# Patient Record
Sex: MALE | Employment: STUDENT | URBAN - NONMETROPOLITAN AREA
[De-identification: names, ages, dates, MRNs, and addresses within clinical notes are randomized per-mention and may not be internally consistent; named-entity substitution may affect disease eponyms.]

---
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
## 2023-11-15 ENCOUNTER — NURSE TRIAGE (OUTPATIENT)
Dept: OTHER | Facility: CLINIC | Age: 18
End: 2023-11-15

## 2023-11-15 ENCOUNTER — HOSPITAL ENCOUNTER (EMERGENCY)
Facility: HOSPITAL | Age: 18
Discharge: HOME | End: 2023-11-15
Payer: OTHER GOVERNMENT

## 2023-11-15 VITALS
DIASTOLIC BLOOD PRESSURE: 61 MMHG | WEIGHT: 190 LBS | HEIGHT: 70 IN | HEART RATE: 55 BPM | OXYGEN SATURATION: 100 % | SYSTOLIC BLOOD PRESSURE: 124 MMHG | BODY MASS INDEX: 27.2 KG/M2 | RESPIRATION RATE: 16 BRPM | TEMPERATURE: 98.1 F

## 2023-11-15 DIAGNOSIS — R51.9 ACUTE NONINTRACTABLE HEADACHE, UNSPECIFIED HEADACHE TYPE: Primary | ICD-10-CM

## 2023-11-15 LAB
GLUCOSE BLDC GLUCOMTR-MCNC: 93 MG/DL (ref 70–130)
GLUCOSE BLDC GLUCOMTR-MCNC: 93 MG/DL (ref 70–130)

## 2023-11-15 PROCEDURE — 25010000002 KETOROLAC TROMETHAMINE PER 15 MG: Performed by: NURSE PRACTITIONER

## 2023-11-15 PROCEDURE — 99283 EMERGENCY DEPT VISIT LOW MDM: CPT

## 2023-11-15 PROCEDURE — 96372 THER/PROPH/DIAG INJ SC/IM: CPT

## 2023-11-15 PROCEDURE — 63710000001 DIPHENHYDRAMINE PER 50 MG: Performed by: NURSE PRACTITIONER

## 2023-11-15 PROCEDURE — 82948 REAGENT STRIP/BLOOD GLUCOSE: CPT

## 2023-11-15 RX ORDER — METOCLOPRAMIDE 10 MG/1
10 TABLET ORAL ONCE
Status: COMPLETED | OUTPATIENT
Start: 2023-11-15 | End: 2023-11-15

## 2023-11-15 RX ORDER — KETOROLAC TROMETHAMINE 30 MG/ML
30 INJECTION, SOLUTION INTRAMUSCULAR; INTRAVENOUS ONCE
Status: COMPLETED | OUTPATIENT
Start: 2023-11-15 | End: 2023-11-15

## 2023-11-15 RX ORDER — DIPHENHYDRAMINE HCL 25 MG
25 CAPSULE ORAL ONCE
Status: COMPLETED | OUTPATIENT
Start: 2023-11-15 | End: 2023-11-15

## 2023-11-15 RX ADMIN — DIPHENHYDRAMINE HYDROCHLORIDE 25 MG: 25 CAPSULE ORAL at 17:40

## 2023-11-15 RX ADMIN — METOCLOPRAMIDE 10 MG: 10 TABLET ORAL at 17:40

## 2023-11-15 RX ADMIN — KETOROLAC TROMETHAMINE 30 MG: 30 INJECTION, SOLUTION INTRAMUSCULAR; INTRAVENOUS at 17:40

## 2023-11-15 NOTE — TELEPHONE ENCOUNTER
Location of patient: [address] call from [place] at [facility]; Patient with Red Flag Complaint requesting to establish care with [facility]. Mother,Tc, is calling. Patient is there. Subjective: Caller states \"vision is blurry, sinus symptoms\"     Current Symptoms: See above, sides of his head hurt, pressure behind eyes. Blurry vision that started today when he tries to focus on something. Onset: several hours ago; unchanged    Associated Symptoms: NA    Pain Severity: 2/10; pressure; constant    Temperature: denies fever     What has been tried: Tylenol    LMP: NA Pregnant: NA    Recommended disposition: Go to ED Now    Care advice provided, patient verbalizes understanding; denies any other questions or concerns; instructed to call back for any new or worsening symptoms. Patient/caller agrees to proceed to nearby Emergency Department. Mother says she will call back at a later time to set him up a new patient appointment. Attention Provider: Thank you for allowing me to participate in the care of your patient. The patient was connected to triage in response to information provided to the [facility]. Please do not respond through this encounter as the response is not directed to a shared pool.     Reason for Disposition   Double vision    Protocols used: Vision Loss or Change-ADULT-

## 2023-11-15 NOTE — ED PROVIDER NOTES
Subjective   History of Present Illness  Noel Herrera is a 18 year old male with no significant past medical history presents to ED today with headache and blurred vision.  Patient states that he was sitting in his truck and got out to talk to someone when he had some blurred vision.  Patient states it felt like there was a fog in his vision and he could not see very clearly.  Patient also notes he has a headache but is just pain behind his eyes.  Patient states he has had no recent falls or trauma, weakness, paresthesias, difficulty walking, chest pain, abdominal pain, difficulty breathing.    History provided by:  Patient   used: No        Review of Systems   Constitutional:  Negative for activity change, chills and fever.   Eyes:  Positive for visual disturbance. Negative for photophobia, pain and redness.   Respiratory:  Negative for cough, chest tightness and shortness of breath.    Cardiovascular:  Negative for chest pain and palpitations.   Gastrointestinal:  Negative for abdominal distention, abdominal pain, diarrhea, nausea and vomiting.   Genitourinary:  Negative for dysuria.   Musculoskeletal:  Negative for arthralgias.   Skin:  Negative for color change, rash and wound.   Neurological:  Positive for headaches. Negative for dizziness, weakness and numbness.   Psychiatric/Behavioral:  Negative for confusion.        Past Medical History:   Diagnosis Date    ADHD        No Known Allergies    Past Surgical History:   Procedure Laterality Date    ELBOW OLECRANNON BURSA EXCISION Left 1/25/2019    Procedure: LEFT ELBOW EXCISION OF SEPTIC OLECRANON BURSA;  Surgeon: Juan R Brito MD;  Location: [facility];  Service: Orthopedics       History reviewed. No pertinent family history.    Social History     Socioeconomic History    Marital status: Single   Tobacco Use    Smoking status: Never    Smokeless tobacco: Never   Vaping Use    Vaping Use: Never used   Substance and Sexual Activity     Alcohol use: No    Drug use: No    Sexual activity: Defer           Objective   Physical Exam  Vitals and nursing note reviewed.   Constitutional:       General: He is not in acute distress.     Appearance: Normal appearance. He is normal weight. He is not ill-appearing or toxic-appearing.   HENT:      Head: Normocephalic and atraumatic.      Right Ear: Tympanic membrane normal.      Left Ear: Tympanic membrane normal.      Nose: Nose normal. No rhinorrhea.      Mouth/Throat:      Mouth: Mucous membranes are moist.   Eyes:      Extraocular Movements: Extraocular movements intact.      Conjunctiva/sclera: Conjunctivae normal.      Pupils: Pupils are equal, round, and reactive to light.   Cardiovascular:      Rate and Rhythm: Normal rate and regular rhythm.      Pulses: Normal pulses.      Heart sounds: Normal heart sounds.   Pulmonary:      Effort: Pulmonary effort is normal.      Breath sounds: Normal breath sounds.   Abdominal:      General: Bowel sounds are normal. There is no distension.      Palpations: Abdomen is soft.   Musculoskeletal:         General: Normal range of motion.      Cervical back: Neck supple.   Skin:     General: Skin is warm and dry.      Capillary Refill: Capillary refill takes less than 2 seconds.   Neurological:      General: No focal deficit present.      Mental Status: He is alert and oriented to person, place, and time.      GCS: GCS eye subscore is 4. GCS verbal subscore is 5. GCS motor subscore is 6.      Cranial Nerves: Cranial nerves 2-12 are intact.      Sensory: Sensation is intact.      Motor: Motor function is intact.      Coordination: Coordination is intact.      Gait: Gait is intact.   Psychiatric:         Mood and Affect: Mood normal.         Behavior: Behavior normal.         Procedures           ED Course                                           Medical Decision Making  In summary, patient presents to ED today with headache behind eyes bilaterally with some foggy vision.   On arrival, patient is ambulatory, afebrile, and normotensive.  Differential diagnoses include, but not limited to, hyperglycemia, migraine headache, tension headache.  Evaluation included point-of-care glucose, visual acuity exam, 30 mg Toradol IM, 10 mg Reglan p.o., 25mg benadryl PO.  Physical exam benign; normal neurological exam.  Glucose 93; after approximately 45 minutes after medications given on reassessment, patient reported almost complete relief of headache and vision changes.  Advised patient to avoid screen use and increase fluid intake.  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  Discussed strict return precautions, including but not limited to, intractable headache, difficulty walking, weakness, and paresthesias.  Advised patient to also get an updated eye exam since he could not remember the last time he had one; patient had 20/16 vision on exam in ED. Patient is agreeable with this plan        Final diagnoses:   Acute nonintractable headache, unspecified headache type       ED Disposition  ED Disposition       ED Disposition   Discharge    Condition   Stable    Comment   --               PATIENT CONNECTION - [facility] [address]  [phone]  Schedule an appointment as soon as possible for a visit in 1 week           Medication List      No changes were made to your prescriptions during this visit.            Lucrecia Haines, APRN  11/15/23 1923

## 2023-11-16 NOTE — DISCHARGE INSTRUCTIONS
Follow-up with primary care provider for recheck-referral been made and you should be contacted to be set up with a primary provider.  Return to ED with any further concerns, symptoms, or as needed.

## 2023-11-17 ENCOUNTER — OFFICE VISIT (OUTPATIENT)
Dept: PRIMARY CARE CLINIC | Age: 18
End: 2023-11-17
Payer: OTHER GOVERNMENT

## 2023-11-17 VITALS
WEIGHT: 207.6 LBS | SYSTOLIC BLOOD PRESSURE: 116 MMHG | HEART RATE: 65 BPM | HEIGHT: 70 IN | TEMPERATURE: 98.4 F | BODY MASS INDEX: 29.72 KG/M2 | OXYGEN SATURATION: 98 % | DIASTOLIC BLOOD PRESSURE: 68 MMHG

## 2023-11-17 DIAGNOSIS — Z76.89 ENCOUNTER TO ESTABLISH CARE: Primary | ICD-10-CM

## 2023-11-17 DIAGNOSIS — G43.909 MIGRAINE WITHOUT STATUS MIGRAINOSUS, NOT INTRACTABLE, UNSPECIFIED MIGRAINE TYPE: ICD-10-CM

## 2023-11-17 PROCEDURE — 99204 OFFICE O/P NEW MOD 45 MIN: CPT | Performed by: NURSE PRACTITIONER

## 2023-11-17 SDOH — ECONOMIC STABILITY: FOOD INSECURITY: WITHIN THE PAST 12 MONTHS, YOU WORRIED THAT YOUR FOOD WOULD RUN OUT BEFORE YOU GOT MONEY TO BUY MORE.: NEVER TRUE

## 2023-11-17 SDOH — ECONOMIC STABILITY: FOOD INSECURITY: WITHIN THE PAST 12 MONTHS, THE FOOD YOU BOUGHT JUST DIDN'T LAST AND YOU DIDN'T HAVE MONEY TO GET MORE.: NEVER TRUE

## 2023-11-17 SDOH — ECONOMIC STABILITY: HOUSING INSECURITY
IN THE LAST 12 MONTHS, WAS THERE A TIME WHEN YOU DID NOT HAVE A STEADY PLACE TO SLEEP OR SLEPT IN A SHELTER (INCLUDING NOW)?: NO

## 2023-11-17 SDOH — ECONOMIC STABILITY: INCOME INSECURITY: HOW HARD IS IT FOR YOU TO PAY FOR THE VERY BASICS LIKE FOOD, HOUSING, MEDICAL CARE, AND HEATING?: NOT HARD AT ALL

## 2023-11-17 ASSESSMENT — ENCOUNTER SYMPTOMS
COUGH: 0
BACK PAIN: 0
RHINORRHEA: 0
VOMITING: 0
COLOR CHANGE: 0
VOICE CHANGE: 0
PHOTOPHOBIA: 0
SHORTNESS OF BREATH: 0
NAUSEA: 0

## 2023-11-17 ASSESSMENT — PATIENT HEALTH QUESTIONNAIRE - PHQ9
2. FEELING DOWN, DEPRESSED OR HOPELESS: 0
SUM OF ALL RESPONSES TO PHQ QUESTIONS 1-9: 0
SUM OF ALL RESPONSES TO PHQ QUESTIONS 1-9: 0
1. LITTLE INTEREST OR PLEASURE IN DOING THINGS: 0
SUM OF ALL RESPONSES TO PHQ QUESTIONS 1-9: 0
SUM OF ALL RESPONSES TO PHQ QUESTIONS 1-9: 0
SUM OF ALL RESPONSES TO PHQ9 QUESTIONS 1 & 2: 0

## 2023-11-17 NOTE — PATIENT INSTRUCTIONS
Fasting labs in [address] within 1-2 weeks.    Schedule an eye exam.   Follow-up annually for physical.

## 2024-03-21 ENCOUNTER — OFFICE VISIT (OUTPATIENT)
Dept: PRIMARY CARE CLINIC | Age: 19
End: 2024-03-21
Payer: COMMERCIAL

## 2024-03-21 VITALS
TEMPERATURE: 98.5 F | SYSTOLIC BLOOD PRESSURE: 130 MMHG | DIASTOLIC BLOOD PRESSURE: 82 MMHG | OXYGEN SATURATION: 97 % | BODY MASS INDEX: 29.41 KG/M2 | HEART RATE: 54 BPM | HEIGHT: 70 IN | WEIGHT: 205.4 LBS

## 2024-03-21 DIAGNOSIS — Z00.00 ENCOUNTER FOR ANNUAL PHYSICAL EXAM: Primary | ICD-10-CM

## 2024-03-21 PROCEDURE — 99395 PREV VISIT EST AGE 18-39: CPT | Performed by: NURSE PRACTITIONER

## 2024-03-21 ASSESSMENT — PATIENT HEALTH QUESTIONNAIRE - PHQ9
SUM OF ALL RESPONSES TO PHQ QUESTIONS 1-9: 0
SUM OF ALL RESPONSES TO PHQ9 QUESTIONS 1 & 2: 0
SUM OF ALL RESPONSES TO PHQ QUESTIONS 1-9: 0
1. LITTLE INTEREST OR PLEASURE IN DOING THINGS: NOT AT ALL
2. FEELING DOWN, DEPRESSED OR HOPELESS: NOT AT ALL

## 2024-03-21 NOTE — PROGRESS NOTES
[facility]  [address]  [address]  [facility] [address]  Dept: [phone]  Dept Fax: [phone]  Loc: [phone]    Duane Sun is a 19 y.o. male who presents today for his medical conditions/complaints as noted below.  Duane Sun is c/o of Annual Exam (PT in office for physical - no new issues or concerns)        HPI:     HPI   Chief Complaint   Patient presents with    Annual Exam     PT in office for physical - no new issues or concerns     Patient presents today for annual physical exam. 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 He needs TB skin test.       Past Medical History:   Diagnosis Date    ADHD (attention deficit hyperactivity disorder)     Allergic       History reviewed. No pertinent surgical history.        3/21/2024     2:50 PM 11/17/2023     9:48 AM 1/4/2022    11:54 AM 11/16/2021    10:16 AM 8/23/2021     8:33 AM 6/21/2021    10:04 AM   Vitals   SYSTOLIC 130 116   118    DIASTOLIC 82 68   82    Site     Right Upper Arm    Position     Sitting    Cuff Size     Large Adult    Pulse 54 65 76 80 78 60   Temp 98.5 °F (36.9 °C) 98.4 °F (36.9 °C) 99.1 °F (37.3 °C) 98 °F (36.7 °C) 98.2 °F (36.8 °C) 98.8 °F (37.1 °C)   SpO2 97 % 98 %       Weight - Scale 205 lb 6.4 oz 207 lb 9.6 oz 190 lb 12.8 oz 193 lb 192 lb 6.4 oz 191 lb 6 oz   Height 5' 10.236\" 5' 10.236\"   5' 10.25\"    Body Mass Index 29.27 kg/m2 29.59 kg/m2   27.41 kg/m2        History reviewed. No pertinent family history.    Social History     Tobacco Use    Smoking status: Never    Smokeless tobacco: Never   Substance Use Topics    Alcohol use: No     Alcohol/week: 0.0 standard drinks of alcohol      No current outpatient medications on file prior to visit.     No current facility-administered medications on file prior to visit.     No Known Allergies    Health Maintenance   Topic Date Due    Pneumococcal 0-64 years Vaccine

## (undated) DEVICE — GLV SURG TRIUMPH GREEN W/ALOE PF LTX 7 STRL

## (undated) DEVICE — GLV SURG BIOGEL LTX PF 6 1/2

## (undated) DEVICE — PK TURNOVER RM ADV

## (undated) DEVICE — GLV SURG TRIUMPH MICRO PF LTX 7.5 STRL

## (undated) DEVICE — UNDERCAST PADDING: Brand: DEROYAL

## (undated) DEVICE — ANTIBACTERIAL UNDYED BRAIDED (POLYGLACTIN 910), SYNTHETIC ABSORBABLE SUTURE: Brand: COATED VICRYL

## (undated) DEVICE — PK EXTRM 30

## (undated) DEVICE — DISPOSABLE TOURNIQUET CUFF SINGLE BLADDER, SINGLE PORT AND QUICK CONNECT CONNECTOR: Brand: COLOR CUFF

## (undated) DEVICE — SHEET,DRAPE,53X77,STERILE: Brand: MEDLINE

## (undated) DEVICE — SUT ETHLN 3/0 FS1 30IN 669H

## (undated) DEVICE — 4-PORT MANIFOLD: Brand: NEPTUNE 2

## (undated) DEVICE — DRSNG GZ CURAD XEROFORM NONADHS 5X9IN STRL

## (undated) DEVICE — GLV SURG TRIUMPH GREEN W/ALOE PF LTX 8 STRL